# Patient Record
Sex: MALE | Race: WHITE | NOT HISPANIC OR LATINO | Employment: FULL TIME | URBAN - NONMETROPOLITAN AREA
[De-identification: names, ages, dates, MRNs, and addresses within clinical notes are randomized per-mention and may not be internally consistent; named-entity substitution may affect disease eponyms.]

---

## 2019-05-02 ENCOUNTER — NURSE TRIAGE (OUTPATIENT)
Dept: CALL CENTER | Facility: HOSPITAL | Age: 49
End: 2019-05-02

## 2019-05-02 ENCOUNTER — TRANSCRIBE ORDERS (OUTPATIENT)
Dept: GENERAL RADIOLOGY | Facility: HOSPITAL | Age: 49
End: 2019-05-02

## 2019-05-02 ENCOUNTER — LAB (OUTPATIENT)
Dept: LAB | Facility: HOSPITAL | Age: 49
End: 2019-05-02

## 2019-05-02 DIAGNOSIS — F15.10 OTHER STIMULANT ABUSE, UNCOMPLICATED (HCC): Primary | ICD-10-CM

## 2019-05-02 DIAGNOSIS — F15.10 OTHER STIMULANT ABUSE, UNCOMPLICATED (HCC): ICD-10-CM

## 2019-05-02 LAB
AMPHET+METHAMPHET UR QL: NEGATIVE
BARBITURATES UR QL SCN: NEGATIVE
BENZODIAZ UR QL SCN: NEGATIVE
CANNABINOIDS SERPL QL: NEGATIVE
COCAINE UR QL: NEGATIVE
METHADONE UR QL SCN: NEGATIVE
OPIATES UR QL: NEGATIVE
PCP UR QL SCN: NEGATIVE

## 2019-05-02 PROCEDURE — 80307 DRUG TEST PRSMV CHEM ANLYZR: CPT | Performed by: PEDIATRICS

## 2022-03-16 ENCOUNTER — OFFICE VISIT (OUTPATIENT)
Dept: FAMILY MEDICINE CLINIC | Facility: CLINIC | Age: 52
End: 2022-03-16

## 2022-03-16 VITALS
BODY MASS INDEX: 26.33 KG/M2 | RESPIRATION RATE: 18 BRPM | WEIGHT: 158 LBS | TEMPERATURE: 97 F | HEIGHT: 65 IN | DIASTOLIC BLOOD PRESSURE: 87 MMHG | SYSTOLIC BLOOD PRESSURE: 150 MMHG | HEART RATE: 84 BPM | OXYGEN SATURATION: 95 %

## 2022-03-16 DIAGNOSIS — F90.2 ATTENTION DEFICIT HYPERACTIVITY DISORDER (ADHD), COMBINED TYPE: ICD-10-CM

## 2022-03-16 DIAGNOSIS — F39 MOOD DISORDER: Primary | ICD-10-CM

## 2022-03-16 PROCEDURE — 99204 OFFICE O/P NEW MOD 45 MIN: CPT | Performed by: PEDIATRICS

## 2022-03-16 RX ORDER — LAMOTRIGINE 25 MG/1
TABLET ORAL
Qty: 42 TABLET | Refills: 0 | Status: SHIPPED | OUTPATIENT
Start: 2022-03-16 | End: 2022-04-13

## 2022-03-16 NOTE — ASSESSMENT & PLAN NOTE
Psychological condition is worsening.  Continue current treatment regimen.  Psychological condition  will be reassessed in 4 weeks     Will start on lamictal.

## 2022-03-16 NOTE — PROGRESS NOTES
"ADHD/Mood HPI    Visit for:  follow-up. Most recent visit was 1 year ago.  Interim changes to follow up on today: no change in medication  Work/School Performance:  struggling  Cognitive:  unable to focus    Behavior  Hyperactivity: hyperactive  Impulsivity: no impulsivity and no unsafe behavior  Tasking: difficulty initiating tasks and difficulty completing tasks    Social  ADHD social/impulsive symptoms:  has difficulty awaiting turn, does not blurt out inappropriate comments, interrupts conversations/activities and excessive talking    Behavioral health  Behavior: demonstrates aggressive behavior and anxious behavior  Emotional coping: demonstrates feelings of anger and anxiety    Chief Complaint  ADHD    Subjective    History of Present Illness      Patient presents to [facility] for   ADHD medications. Patient was previously treated by our office and was last seen a year ago. 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 He was treated in rehab with Strattera and would like to discuss resuming this medication if possible. He admits to mood swings and getting irritated very quickly.        Review of Systems   Psychiatric/Behavioral: Positive for agitation, behavioral problems, decreased concentration and positive for hyperactivity. The patient is nervous/anxious.    All other systems reviewed and are negative.      I have reviewed and agree with the HPI and ROS information as above.  Fredy Cannon MD     Objective   Vital Signs:   /87   Pulse 84   Temp 97 °F (36.1 °C)   Resp 18   Ht 165.1 cm (65\")   Wt 71.7 kg (158 lb)   SpO2 95%   BMI 26.29 kg/m²     Patient's Body mass index is 26.29 kg/m². indicating that he is within normal range (BMI 18.5-24.9). No BMI management plan needed..      Physical Exam  Constitutional:       Appearance: Normal appearance. He is overweight.   Cardiovascular:     "  Rate and Rhythm: Normal rate and regular rhythm.      Heart sounds: Normal heart sounds.   Pulmonary:      Effort: Pulmonary effort is normal.      Breath sounds: Normal breath sounds.   Neurological:      Mental Status: He is alert.   Psychiatric:         Mood and Affect: Mood normal.         Behavior: Behavior normal.          Result Review  Data Reviewed:                   Assessment and Plan      Problem List Items Addressed This Visit        Mental Health    Mood disorder (HCC) - Primary    Current Assessment & Plan     Psychological condition is worsening.  Continue current treatment regimen.  Psychological condition  will be reassessed in 4 weeks     Will start on lamictal.           Relevant Medications    lamoTRIgine (LaMICtal) 25 MG tablet    Attention deficit hyperactivity disorder (ADHD), combined type    Current Assessment & Plan       Will first help the mood and then consider straterra                     Follow Up   Return in about 4 weeks (around 4/13/2022) for Recheck.  Patient was given instructions and counseling regarding his condition or for health maintenance advice. Please see specific information pulled into the AVS if appropriate.

## 2022-04-06 DIAGNOSIS — F39 MOOD DISORDER: ICD-10-CM

## 2022-04-06 RX ORDER — LAMOTRIGINE 25 MG/1
TABLET ORAL
Qty: 42 TABLET | Refills: 0 | OUTPATIENT
Start: 2022-04-06 | End: 2022-05-04

## 2022-06-20 ENCOUNTER — TELEPHONE (OUTPATIENT)
Dept: FAMILY MEDICINE CLINIC | Facility: CLINIC | Age: 52
End: 2022-06-20

## 2022-07-19 ENCOUNTER — TELEPHONE (OUTPATIENT)
Dept: FAMILY MEDICINE CLINIC | Facility: CLINIC | Age: 52
End: 2022-07-19